# Patient Record
Sex: FEMALE | Race: WHITE | NOT HISPANIC OR LATINO | Employment: OTHER | URBAN - METROPOLITAN AREA
[De-identification: names, ages, dates, MRNs, and addresses within clinical notes are randomized per-mention and may not be internally consistent; named-entity substitution may affect disease eponyms.]

---

## 2024-07-08 ENCOUNTER — OFFICE VISIT (OUTPATIENT)
Dept: PODIATRY | Facility: CLINIC | Age: 64
End: 2024-07-08
Payer: MEDICARE

## 2024-07-08 VITALS — BODY MASS INDEX: 33.16 KG/M2 | WEIGHT: 180.19 LBS | HEIGHT: 62 IN

## 2024-07-08 DIAGNOSIS — L97.511 ULCER OF RIGHT FOOT, LIMITED TO BREAKDOWN OF SKIN: Primary | ICD-10-CM

## 2024-07-08 DIAGNOSIS — E11.9 COMPREHENSIVE DIABETIC FOOT EXAMINATION, TYPE 2 DM, ENCOUNTER FOR: ICD-10-CM

## 2024-07-08 DIAGNOSIS — M72.2 PLANTAR FASCIITIS: ICD-10-CM

## 2024-07-08 PROCEDURE — 1125F AMNT PAIN NOTED PAIN PRSNT: CPT | Mod: CPTII,S$GLB,, | Performed by: PODIATRIST

## 2024-07-08 PROCEDURE — 3008F BODY MASS INDEX DOCD: CPT | Mod: CPTII,S$GLB,, | Performed by: PODIATRIST

## 2024-07-08 PROCEDURE — 1159F MED LIST DOCD IN RCRD: CPT | Mod: CPTII,S$GLB,, | Performed by: PODIATRIST

## 2024-07-08 PROCEDURE — 97597 DBRDMT OPN WND 1ST 20 CM/<: CPT | Mod: S$GLB,,, | Performed by: PODIATRIST

## 2024-07-08 PROCEDURE — 1160F RVW MEDS BY RX/DR IN RCRD: CPT | Mod: CPTII,S$GLB,, | Performed by: PODIATRIST

## 2024-07-08 PROCEDURE — 99204 OFFICE O/P NEW MOD 45 MIN: CPT | Mod: 25,S$GLB,, | Performed by: PODIATRIST

## 2024-07-08 RX ORDER — PANTOPRAZOLE SODIUM 40 MG/1
40 TABLET, DELAYED RELEASE ORAL
COMMUNITY

## 2024-07-08 RX ORDER — CALCIUM CITRATE/VITAMIN D3 200MG-6.25
TABLET ORAL
COMMUNITY
Start: 2024-05-02

## 2024-07-08 RX ORDER — BLOOD-GLUCOSE CONTROL, NORMAL
EACH MISCELLANEOUS 2 TIMES DAILY
COMMUNITY
Start: 2024-05-02

## 2024-07-08 RX ORDER — LOSARTAN POTASSIUM AND HYDROCHLOROTHIAZIDE 12.5; 5 MG/1; MG/1
1 TABLET ORAL
COMMUNITY

## 2024-07-08 RX ORDER — FAMOTIDINE 20 MG/1
20 TABLET, FILM COATED ORAL
COMMUNITY

## 2024-07-08 RX ORDER — METFORMIN HYDROCHLORIDE 500 MG/1
500 TABLET ORAL
COMMUNITY

## 2024-07-08 RX ORDER — BLOOD-GLUCOSE METER
EACH MISCELLANEOUS
COMMUNITY
Start: 2024-05-02

## 2024-07-08 RX ORDER — ROSUVASTATIN CALCIUM 40 MG/1
40 TABLET, COATED ORAL NIGHTLY
COMMUNITY

## 2024-07-08 RX ORDER — SEMAGLUTIDE 0.68 MG/ML
INJECTION, SOLUTION SUBCUTANEOUS
COMMUNITY

## 2024-07-08 RX ORDER — DAPAGLIFLOZIN 5 MG/1
5 TABLET, FILM COATED ORAL
COMMUNITY

## 2024-07-08 NOTE — PROGRESS NOTES
Subjective:     Patient ID: Natalya Marrero is a 63 y.o. female    Chief Complaint: Heel Pain and Callouses       Natalya is a 63 y.o. female who presents to the clinic complaining of heel pain in the right foot, especially with the first step in the morning. The pain is described as Grabbing and Tight. The onset of the pain was gradual and has worsened slightly over the past several weeks. Natalya rates the pain as 7/10. She denies a history of trauma. Prior treatments include OTC analgesics was were mildly effective.    Review of Systems   Constitutional: Negative.  Negative for chills and fever.   Respiratory:  Negative for cough and shortness of breath.    Cardiovascular:  Negative for chest pain and leg swelling.   Gastrointestinal:  Negative for diarrhea, nausea and vomiting.   Neurological:  Negative for tingling.        Objective:   Physical Exam  Vitals reviewed.   Constitutional:       General: She is not in acute distress.     Appearance: Normal appearance. She is not ill-appearing.   HENT:      Head: Normocephalic.      Nose: Nose normal.   Pulmonary:      Effort: Pulmonary effort is normal. No respiratory distress.   Skin:     Capillary Refill: Capillary refill takes 2 to 3 seconds.   Neurological:      Mental Status: She is alert and oriented to person, place, and time.   Psychiatric:         Mood and Affect: Mood normal.         Behavior: Behavior normal.         Thought Content: Thought content normal.         Judgment: Judgment normal.        Foot Exam    General  General Appearance: appears stated age and healthy   Orientation: alert and oriented to person, place, and time   Affect: appropriate   Gait: unimpaired       Left Foot/Ankle      Inspection and Palpation  Tenderness: plantar fascia and calcaneus tenderness   Swelling: plantar fascia       Dermatologic: Small wound noted to right posterior heel measures 0.4 x 0.4 x 0.1 cm     Assessment:         1. Ulcer of right foot, limited to  breakdown of skin    2. Comprehensive diabetic foot examination, type 2 DM, encounter for    3. Plantar fasciitis       Plan:     Natalya Marrero was seen today for   Chief Complaint   Patient presents with    Heel Pain    Callouses       Assessment & Plan           Wound Debridement    Date/Time: 7/8/2024 2:45 PM    Performed by: Estiven Hurt DPM  Authorized by: Estiven Hurt DPM    Consent Done?:  Yes (Verbal)    Wound Details:    Location:  Right foot    Location:  Right Heel       Length (cm):  0.4       Area (sq cm):  0.16       Width (cm):  0.4       Percent Debrided (%):  100       Depth (cm):  0.1       Total Area Debrided (sq cm):  0.16    Depth of debridement:  Epidermis/Dermis    Devitalized tissue debrided:  Biofilm and Callus    Instruments:  Blade  Bleeding:  None  Patient tolerance:  Patient tolerated the procedure well with no immediate complications       1. Patient was examined and evaluated.    2. Discussed with patient etiology of plantar fasciitis.  Conservative treatment options were discussed in detail with the patient.  Stretching instructions were demonstrated for the patient.  Stretching instructions were then printed and dispensed to the patient for home reference.  Discussed with patient possible benefits of local corticosteroid injection or oral Medrol Dosepak should pain complaints worsen over time.  Patient was advised to adjunct with OTC analgesics/NSAIDs for pain relief.  Patient was advised to ice and elevate the lower extremity at end of days.  3. Patient made aware of small ulcer to posterior aspect of the right heel foot seem to be related to shoe gear use.  Patient was advised to cleanse the area daily, apply triple antibiotic ointment and a dry sterile bandage.  Patient did have a offloading aperture pad placed to this area today in the clinic  4. Patient had a low dye applied to the right foot.  Patient will keep this clean, dry, intact 1 3 days.  5. Patient  will follow-up in 1 week or p.r.n. for complaints      Karo Hurt DPM  [address] [address], [place] [address]  [phone]      This note was created using 3M fluency direct voice recognition software. Note may have occasional typographical errors that may not have been identified and edited despite initial review prior to signing.

## 2024-07-08 NOTE — PROCEDURES
"Procedures  STRAPPING/TAPING OF ANKLE AND FOOT:  LOW DYE RIGHT FOOT    Patient had a low dye taping applied to the right foot-1 in athletic tape applied from lateral 5th MPJ around the ankle to medial 1st MPJ x 2 with two plantar straps of 2 inch athletic tape applied from medial to lateral from just distal to the heel to the level of the midfoot. Then,  1 in athletic tape applied from lateral 5th MPJ around the ankle to medial 1st MPJ x 2 along with a Matos's rest strap composed of 3 " tensoplast. Keep taping clean, dry, and intact for 1 - 3 days.    "

## 2024-07-15 ENCOUNTER — OFFICE VISIT (OUTPATIENT)
Dept: PODIATRY | Facility: CLINIC | Age: 64
End: 2024-07-15
Payer: MEDICARE

## 2024-07-15 VITALS — BODY MASS INDEX: 33.01 KG/M2 | HEIGHT: 62 IN | WEIGHT: 179.38 LBS

## 2024-07-15 DIAGNOSIS — M76.61 ACHILLES TENDINITIS OF RIGHT LOWER EXTREMITY: Primary | ICD-10-CM

## 2024-07-15 PROCEDURE — 1125F AMNT PAIN NOTED PAIN PRSNT: CPT | Mod: CPTII,S$GLB,, | Performed by: PODIATRIST

## 2024-07-15 PROCEDURE — 29540 STRAPPING ANKLE &/FOOT: CPT | Mod: RT,S$GLB,, | Performed by: PODIATRIST

## 2024-07-15 PROCEDURE — 3008F BODY MASS INDEX DOCD: CPT | Mod: CPTII,S$GLB,, | Performed by: PODIATRIST

## 2024-07-15 PROCEDURE — 99213 OFFICE O/P EST LOW 20 MIN: CPT | Mod: 25,S$GLB,, | Performed by: PODIATRIST

## 2024-07-15 RX ORDER — ALENDRONATE SODIUM 70 MG/1
70 TABLET ORAL
COMMUNITY
Start: 2024-07-10

## 2024-07-15 NOTE — PROGRESS NOTES
Subjective:     Patient ID: Natalya Marrero is a 63 y.o. female.    Chief Complaint: Foot Pain    Natalya is a 63 y.o. female who presents to the clinic complaining of heel pain in the right foot, especially with the first step in the morning. The pain is described as Aching, Dull, and Deep. The onset of the pain was gradual and has moderately improved over the past several days. Natalya rates the pain as 6/10. She denies a history of trauma. Prior treatments include low dye taping and treatment of right foot posterior heel wounds.    Review of Systems   Constitutional: Negative for chills and fever.   Cardiovascular:  Negative for chest pain and leg swelling.   Respiratory:  Negative for cough and shortness of breath.    Gastrointestinal:  Negative for diarrhea, nausea and vomiting.   Neurological:  Positive for numbness and paresthesias.        Objective:     Physical Exam  Vitals reviewed.   Constitutional:       General: She is not in acute distress.     Appearance: Normal appearance. She is not ill-appearing.   HENT:      Head: Normocephalic.      Nose: Nose normal.   Pulmonary:      Effort: Pulmonary effort is normal. No respiratory distress.   Skin:     Capillary Refill: Capillary refill takes 2 to 3 seconds.   Neurological:      Mental Status: She is alert and oriented to person, place, and time.   Psychiatric:         Mood and Affect: Mood normal.         Behavior: Behavior normal.         Thought Content: Thought content normal.         Judgment: Judgment normal.       Neurologic: Protective and light touch sensation intact bilateral lower extremity, positive paresthesias reported from distal digits bilateral foot right greater than left   Musculoskeletal: 5/5 muscle strength noted bilateral foot, right ankle joint range of motion is reduced with pain and tenderness with palpation right posterior calcaneus at insertion of the Achilles tendon but improved from previous appointment, no masses noted  bilateral foot, no pain and tenderness with palpation of previous wounds right posterior heel   Dermatologic: No open lesions noted bilateral foot, no rashes noted bilateral foot, hyperkeratotic skin noted at previous wound posterior right heel and at right 5th digit PIPJ, no interdigital maceration noted bilateral foot   Vascular: DP and PT pulses palpable 1/4 bilateral foot, capillary fill time less 3 seconds digits aspect of the digits bilateral foot    Assessment:      Encounter Diagnosis   Name Primary?    Achilles tendinitis of right lower extremity Yes     Plan:     Natalya was seen today for foot pain.    Diagnoses and all orders for this visit:    Achilles tendinitis of right lower extremity      I counseled the patient on her conditions, their implications and medical management.        1. Patient was examined and evaluated.    2. Discussed with patient etiology of Achilles tendinitis.  Patient was advised to continue with stretching regimen discussed at previous office visit for plantar fasciitis/ heel spur syndrome. Discussed with patient potential benefits of local corticosteroid injection or oral Medrol Dosepak for pain complaints.  Patient was advised to continue to adjunct with OTC NSAIDs for pain relief  3. Discussed with patient benefits of previous low dye taping.  Patient had a distal low dye applied to the right foot today.  Patient will keep this dressing clean dry and intact for 1-3 days.    4. Patient will follow-up in 2 weeks p.r.n. for complaints

## 2024-09-03 ENCOUNTER — OFFICE VISIT (OUTPATIENT)
Dept: PODIATRY | Facility: CLINIC | Age: 64
End: 2024-09-03
Payer: MEDICARE

## 2024-09-03 VITALS — HEIGHT: 62 IN | BODY MASS INDEX: 32.32 KG/M2 | WEIGHT: 175.63 LBS

## 2024-09-03 DIAGNOSIS — M77.51 CAPSULITIS OF TOE OF RIGHT FOOT: Primary | ICD-10-CM

## 2024-09-03 PROCEDURE — 1160F RVW MEDS BY RX/DR IN RCRD: CPT | Mod: CPTII,S$GLB,, | Performed by: PODIATRIST

## 2024-09-03 PROCEDURE — 3008F BODY MASS INDEX DOCD: CPT | Mod: CPTII,S$GLB,, | Performed by: PODIATRIST

## 2024-09-03 PROCEDURE — 1159F MED LIST DOCD IN RCRD: CPT | Mod: CPTII,S$GLB,, | Performed by: PODIATRIST

## 2024-09-03 PROCEDURE — 1125F AMNT PAIN NOTED PAIN PRSNT: CPT | Mod: CPTII,S$GLB,, | Performed by: PODIATRIST

## 2024-09-03 PROCEDURE — 99213 OFFICE O/P EST LOW 20 MIN: CPT | Mod: 25,S$GLB,, | Performed by: PODIATRIST

## 2024-09-03 PROCEDURE — 20600 DRAIN/INJ JOINT/BURSA W/O US: CPT | Mod: RT,S$GLB,, | Performed by: PODIATRIST

## 2024-09-03 RX ADMIN — LIDOCAINE HYDROCHLORIDE 1 ML: 10 INJECTION, SOLUTION INFILTRATION; PERINEURAL at 04:09

## 2024-09-03 RX ADMIN — DEXAMETHASONE SODIUM PHOSPHATE 4 MG: 4 INJECTION, SOLUTION INTRA-ARTICULAR; INTRALESIONAL; INTRAMUSCULAR; INTRAVENOUS; SOFT TISSUE at 04:09

## 2024-09-05 RX ORDER — DEXAMETHASONE SODIUM PHOSPHATE 4 MG/ML
4 INJECTION, SOLUTION INTRA-ARTICULAR; INTRALESIONAL; INTRAMUSCULAR; INTRAVENOUS; SOFT TISSUE
Status: DISCONTINUED | OUTPATIENT
Start: 2024-09-03 | End: 2024-09-05 | Stop reason: HOSPADM

## 2024-09-05 RX ORDER — LIDOCAINE HYDROCHLORIDE 10 MG/ML
1 INJECTION, SOLUTION INFILTRATION; PERINEURAL
Status: DISCONTINUED | OUTPATIENT
Start: 2024-09-03 | End: 2024-09-05 | Stop reason: HOSPADM

## 2024-09-05 NOTE — PROGRESS NOTES
Subjective:     Patient ID: Natalya Marrero is a 63 y.o. female    Chief Complaint: Foot Pain       Natalya is a 63 y.o. female who presents to the podiatry clinic  with complaint of  right foot pain. Onset of the symptoms was several days ago. Precipitating event: increased activity. Current symptoms include: ability to bear weight, but with some pain, swelling, and worsening symptoms after a period of activity. Aggravating factors: walking. Symptoms have gradually worsened. Patient has had prior foot problems.  Treatment to date: ice and OTC analgesics which are not very effective. Patients rates pain 10/10 on pain scale.    Review of Systems   Constitutional: Negative.  Negative for chills and fever.   Respiratory:  Negative for cough and shortness of breath.    Cardiovascular:  Negative for chest pain and leg swelling.   Gastrointestinal:  Negative for diarrhea, nausea and vomiting.   Neurological:  Negative for tingling.        Objective:   Physical Exam  Vitals reviewed.   Constitutional:       General: She is not in acute distress.     Appearance: Normal appearance. She is not ill-appearing.   HENT:      Head: Normocephalic.      Nose: Nose normal.   Cardiovascular:      Pulses:           Dorsalis pedis pulses are 1+ on the right side and 1+ on the left side.        Posterior tibial pulses are 1+ on the right side and 1+ on the left side.   Pulmonary:      Effort: Pulmonary effort is normal. No respiratory distress.   Skin:     Capillary Refill: Capillary refill takes 2 to 3 seconds.   Neurological:      Mental Status: She is alert and oriented to person, place, and time.   Psychiatric:         Mood and Affect: Mood normal.         Behavior: Behavior normal.         Thought Content: Thought content normal.         Judgment: Judgment normal.        Foot Exam    General  General Appearance: appears stated age and healthy   Orientation: alert and oriented to person, place, and time   Affect: appropriate    Gait: antalgic       Right Foot/Ankle     Inspection and Palpation  Tenderness: lesser metatarsophalangeal joints   Swelling: lesser metatarsophalangeal joints     Neurovascular  Dorsalis pedis: 1+  Posterior tibial: 1+    Muscle Strength  Ankle dorsiflexion: 5  Ankle plantar flexion: 5  Ankle inversion: 5  Ankle eversion: 5      Left Foot/Ankle      Neurovascular  Dorsalis pedis: 1+  Posterior tibial: 1+    Muscle Strength  Ankle dorsiflexion: 5  Ankle plantar flexion: 5  Ankle inversion: 5  Ankle eversion: 5  Great toe extension: 5  Great toe flexion: 5       Musculoskeletal:  Pain and tenderness with palpation right 5th MPJ with some redness and some irritation with range of motion    Assessment:         1. Capsulitis of toe of right foot       Plan:     Natalya Marrero was seen today for   Chief Complaint   Patient presents with    Foot Pain       Assessment & Plan           Small Joint Aspiration/Injection: R small MTP    Date/Time: 9/3/2024 4:30 PM    Performed by: Estiven Hurt DPM  Authorized by: Estiven Hurt DPM    Consent Done?:  Yes (Verbal)  Indications:  Pain, joint swelling and diagnostic evaluation  Location:  Small toe  Site:  R small MTP  Ultrasonic guidance for needle placement?: No    Needle size:  25 G  Approach:  Lateral  Medications:  1 mL LIDOcaine HCL 10 mg/ml (1%) 10 mg/mL (1 %); 4 mg dexAMETHasone 4 mg/mL  Patient tolerance:  Patient tolerated the procedure well with no immediate complications       1. Patient was examined and evaluated.    2. Discussed with patient possible capsulitis right 5th MPJ.  Patient was advised to decrease the amount of ambulation.  Patient was advised to ice and elevate right foot at end of days.  Patient will adjust shoe gear for better comfort and fit.  Discussed with patient potential benefits of local corticosteroid injection for improvement of pain complaints.  Patient was advised to adjunct with OTC analgesics/NSAIDs for pain relief.    3.  Patient will follow-up in 1 week or p.r.n. for complaints      Karo Hurt DPM  [address] [place] [address]  [phone]      This note was created using 3M fluency direct voice recognition software. Note may have occasional typographical errors that may not have been identified and edited despite initial review prior to signing.

## 2024-09-17 ENCOUNTER — OFFICE VISIT (OUTPATIENT)
Dept: PODIATRY | Facility: CLINIC | Age: 64
End: 2024-09-17
Payer: MEDICARE

## 2024-09-17 VITALS — BODY MASS INDEX: 31.5 KG/M2 | HEIGHT: 62 IN | WEIGHT: 171.19 LBS

## 2024-09-17 DIAGNOSIS — M77.51 CAPSULITIS OF TOE OF RIGHT FOOT: Primary | ICD-10-CM

## 2024-09-17 PROCEDURE — 20600 DRAIN/INJ JOINT/BURSA W/O US: CPT | Mod: RT,S$GLB,, | Performed by: PODIATRIST

## 2024-09-17 PROCEDURE — 99499 UNLISTED E&M SERVICE: CPT | Mod: S$GLB,,, | Performed by: PODIATRIST

## 2024-09-17 RX ORDER — HYDROXYZINE HYDROCHLORIDE 10 MG/1
20 TABLET, FILM COATED ORAL
COMMUNITY
Start: 2024-09-04

## 2024-09-17 RX ORDER — FLUCONAZOLE 150 MG/1
150 TABLET ORAL ONCE
COMMUNITY
Start: 2024-09-16

## 2024-09-17 RX ORDER — SERTRALINE HYDROCHLORIDE 50 MG/1
50 TABLET, FILM COATED ORAL
COMMUNITY
Start: 2024-09-04

## 2024-09-17 RX ADMIN — LIDOCAINE HYDROCHLORIDE 1 ML: 10 INJECTION, SOLUTION INFILTRATION; PERINEURAL at 02:09

## 2024-09-17 RX ADMIN — DEXAMETHASONE SODIUM PHOSPHATE 4 MG: 4 INJECTION, SOLUTION INTRA-ARTICULAR; INTRALESIONAL; INTRAMUSCULAR; INTRAVENOUS; SOFT TISSUE at 02:09

## 2024-09-17 NOTE — PROGRESS NOTES
Subjective:     Patient ID: Natalya Marrero is a 63 y.o. female    Chief Complaint: Foot Pain       Natalya is a 63 y.o. female who presents to the podiatry clinic  with complaint of  right foot pain. Onset of the symptoms was several weeks ago. Precipitating event: increased activity. Current symptoms include: ability to bear weight, but with some pain and worsening symptoms after a period of activity. Aggravating factors: walking. Symptoms have gradually improved. Patient has had prior foot problems. Treatment to date: corticosteroid injection which was somewhat effective and rest. Patients rates pain 8/10 on pain scale.    Review of Systems   Constitutional: Negative.  Negative for chills and fever.   Respiratory:  Negative for cough and shortness of breath.    Cardiovascular:  Negative for chest pain and leg swelling.   Gastrointestinal:  Negative for diarrhea, nausea and vomiting.   Neurological:  Negative for tingling.        Objective:   Physical Exam  Vitals reviewed.   Constitutional:       General: She is not in acute distress.     Appearance: Normal appearance. She is not ill-appearing.   HENT:      Head: Normocephalic.      Nose: Nose normal.   Cardiovascular:      Pulses:           Dorsalis pedis pulses are 1+ on the right side and 1+ on the left side.        Posterior tibial pulses are 1+ on the right side and 1+ on the left side.   Pulmonary:      Effort: Pulmonary effort is normal. No respiratory distress.   Skin:     Capillary Refill: Capillary refill takes 2 to 3 seconds.   Neurological:      Mental Status: She is alert and oriented to person, place, and time.   Psychiatric:         Mood and Affect: Mood normal.         Behavior: Behavior normal.         Thought Content: Thought content normal.         Judgment: Judgment normal.        Foot Exam    General  General Appearance: appears stated age and healthy   Orientation: alert and oriented to person, place, and time   Affect: appropriate        Right Foot/Ankle     Inspection and Palpation  Tenderness: lesser metatarsophalangeal joints   Swelling: lesser metatarsophalangeal joints   Hammertoes: fifth toe    Neurovascular  Dorsalis pedis: 1+  Posterior tibial: 1+    Muscle Strength  Ankle dorsiflexion: 5  Ankle plantar flexion: 5  Ankle inversion: 5  Ankle eversion: 5  Great toe extension: 5  Great toe flexion: 5      Left Foot/Ankle      Neurovascular  Dorsalis pedis: 1+  Posterior tibial: 1+    Muscle Strength  Ankle dorsiflexion: 5  Ankle plantar flexion: 5  Ankle inversion: 5  Ankle eversion: 5  Great toe extension: 5  Great toe flexion: 5      Musculoskeletal: Pain and tenderness with palpation right 5th MPJ and range of motion of the digit at the metatarsophalangeal joint     Assessment:         1. Capsulitis of toe of right foot       Plan:     Natalyaerrol Marrero was seen today for   Chief Complaint   Patient presents with    Foot Pain       Assessment & Plan           Small Joint Aspiration/Injection: R small MTP    Date/Time: 9/17/2024 2:00 PM    Performed by: Estiven Hurt DPM  Authorized by: Estiven Hurt DPM    Consent Done?:  Yes (Verbal)  Indications:  Pain, joint swelling, diagnostic evaluation and arthritis  Location:  Small toe  Site:  R small MTP  Ultrasonic guidance for needle placement?: No    Needle size:  25 G  Approach:  Lateral  Medications:  1 mL LIDOcaine HCL 10 mg/ml (1%) 10 mg/mL (1 %); 4 mg dexAMETHasone 4 mg/mL  Patient tolerance:  Patient tolerated the procedure well with no immediate complications       1. Patient was examined and evaluated.    2. Discussed with patient continue capsulitis from right foot near the 5th MPJ.  Patient was advised to continue efforts of the obtainment of shoe gear that do not aggressively rub this area.  Patient will also continue to ice and elevate right lateral 5th digit at end of days if appropriate.  Discussed with patient potential benefits of further local corticosteroid  injection or oral Medrol Dosepak for reduction of pain complaints.  Patient will continue to adjunct with OTC analgesics/NSAIDs for pain relief   3. Patient will follow-up in 2-4 weeks or p.r.n. for complaints      Karo Hurt DPM  [id number] [address] [address]  [phone]      This note was created using Helpr direct voice recognition software. Note may have occasional typographical errors that may not have been identified and edited despite initial review prior to signing.

## 2024-10-02 RX ORDER — DEXAMETHASONE SODIUM PHOSPHATE 4 MG/ML
4 INJECTION, SOLUTION INTRA-ARTICULAR; INTRALESIONAL; INTRAMUSCULAR; INTRAVENOUS; SOFT TISSUE
Status: DISCONTINUED | OUTPATIENT
Start: 2024-09-17 | End: 2024-10-02 | Stop reason: HOSPADM

## 2024-10-02 RX ORDER — LIDOCAINE HYDROCHLORIDE 10 MG/ML
1 INJECTION, SOLUTION INFILTRATION; PERINEURAL
Status: DISCONTINUED | OUTPATIENT
Start: 2024-09-17 | End: 2024-10-02 | Stop reason: HOSPADM